# Patient Record
(demographics unavailable — no encounter records)

---

## 2022-02-14 NOTE — NUR
0915 - PATIENT REQUESTING EPIDURAL.
 
0916 - SARAH CARDENAS NOTIFIED FOR ANESTHESIA.
 
0935 - LR BOLUS INITIATED.
 
0940 - SARAH CARDENAS AT BEDSIDE FOR EPIDURAL PLACEMENT. PLAN OF CARE
DISCUSSED.
 
0944 - SINGLE SHOT GIVEN BY CRNA.
 
0950 - PATIENT PLACED IN LEFT WEDGE POSITION. VSS. PITOCIN GTT CONTINUES.  RN
REMAINS AT BEDSIDE. CARE ONGOING.

## 2022-02-14 NOTE — NUR
0910 - MD LARRY AT BEDSIDE. SVE 4-5/90/-2. PLAN OF CARE DISCUSSED.
 
0911 - AROM BY MD LARRY.
 
PITOCIN GTT CONTINUES. VSS. CARE ONGOING.

## 2022-02-14 NOTE — NUR
PT HELPED TO BATHROOM, VOIDED 500CC PERICARE GIVEN WITH EDUCATION. PT HELPED
TO WHEELCHAIR AND MOVED TO POSTPARTUM ROOM WITH ALL BELONGINGS AND  AND
BABY

## 2022-02-14 NOTE — NUR
1306 - PATIENT COMPLETE AND +2. MD LARRY ON UNIT AND NOTIFIED.
 
1316 - PATIENT INSTRUCTED ON PUSHING WITH CONTRACTIONS.  PATIENT PUSHING WITH
CONTRACTIONS. MOVES VERTEX WELL.
 
1320 - MD LARRY AT BEDSIDE TO EVALUATE PUSHING EFFORTS. INTERMITTENT
LATE AND VARIABLE DECELS. PATIENT WEDGED LEFT. CONTINUES TO PUSH WITH
CONTRACTIONS.

## 2022-02-14 NOTE — NUR
0620 - PATIENT AMBULATORY TO LDR6 WITH SPOUSE. PATIENT CHANGES INTO GOWN AND
IS PLACED ON MONITOR AT 0628. PATIENT REPORTS INCREASED VAGINAL
DISCHARGE/LEAKING OF FLUID. PATIENT ALSO REPORTS CONTRACTIONS STARTING
THIS MORNING AT 0330. VSS.
 
0630 - CONSENTS REVIEWED AND SIGNED.
 
0635 - IV PLACED IN LEFT HAND. LABS DRAWN AS ORDERED.
 
0715 - LR INFUSING. AMNI TEST NEGATIVE. SVE 3.5/80/-3.
 
0719 - PITOCIN GTT INITIATED AT 2MILIUNITS/HR. PLAN OF CARE DISCUSSED. PATIENT
RESTING. CALL LIGHT WITHIN REACH. CARE ONGOING.

## 2022-02-14 NOTE — NUR
1354Dr. Roles at bedside for delivery. Patient continues to push with
contractions with Dr. Cabrales at bedside.
 
1357Spontaneous vaginal delivery of viable male infant. To mother's chest
where dried and stimulated by nursery RN. Pitocin paused.
 
1359- Cord clamped x2 and cut by father of . Care of  assumed by
LUCINA Skaggs RN.
 
1402 Spontaneous and intact delivery of placenta. Pitocin to 333ml/hr per
protocol. Third degree perineal lac repaired by Dr. Cabrales. Fundus firm,
midline, and bleeding mininmal. Desiree care provided, pads changed, and ice
pack to perineum. Plan of care and safety precautions reviewed. See
doctor dictation, anesthesia record, and nurses notes. Home

## 2022-02-15 NOTE — NUR
Initial visit; Parents thanked  for offering congratulations and God's
blessings for the birth of their son.  thanked family for choosing
[facility].

## 2022-02-16 NOTE — NUR
1045DISCHARGE INSTRUCTIONS REVIEWED WITH PATIENT AND PATIENT'S SPOUSE. BOTH
VERBALIZED UNDERSTANDING. WILL NOTIFY THIS RN WHEN READY TO LEAVE.
 
1140ALL PERSONAL BELONGINGS GATHERED FROM PATIENT ROOM. PATIENT LEFT
AMBULATORY AND IN NO APPARENT DISTRESS. PATIENT ACCOMPANIED BY SPOUSE AND
THIS RN.